# Patient Record
Sex: MALE | URBAN - METROPOLITAN AREA
[De-identification: names, ages, dates, MRNs, and addresses within clinical notes are randomized per-mention and may not be internally consistent; named-entity substitution may affect disease eponyms.]

---

## 2021-11-01 ENCOUNTER — OUTPATIENT (OUTPATIENT)
Dept: OUTPATIENT SERVICES | Facility: HOSPITAL | Age: 66
LOS: 1 days | End: 2021-11-01
Payer: MEDICAID

## 2021-11-01 PROCEDURE — G9005: CPT

## 2021-11-02 ENCOUNTER — INPATIENT (INPATIENT)
Facility: HOSPITAL | Age: 66
LOS: 0 days | Discharge: HOME | DRG: 247 | End: 2021-11-03
Attending: INTERNAL MEDICINE | Admitting: INTERNAL MEDICINE
Payer: COMMERCIAL

## 2021-11-02 VITALS
HEIGHT: 61 IN | TEMPERATURE: 98 F | HEART RATE: 85 BPM | SYSTOLIC BLOOD PRESSURE: 156 MMHG | OXYGEN SATURATION: 99 % | RESPIRATION RATE: 18 BRPM | DIASTOLIC BLOOD PRESSURE: 81 MMHG | WEIGHT: 121.92 LBS

## 2021-11-02 DIAGNOSIS — I10 ESSENTIAL (PRIMARY) HYPERTENSION: ICD-10-CM

## 2021-11-02 DIAGNOSIS — N18.9 CHRONIC KIDNEY DISEASE, UNSPECIFIED: ICD-10-CM

## 2021-11-02 DIAGNOSIS — I25.10 ATHEROSCLEROTIC HEART DISEASE OF NATIVE CORONARY ARTERY WITHOUT ANGINA PECTORIS: ICD-10-CM

## 2021-11-02 DIAGNOSIS — E78.5 HYPERLIPIDEMIA, UNSPECIFIED: ICD-10-CM

## 2021-11-02 LAB
A1C WITH ESTIMATED AVERAGE GLUCOSE RESULT: 5.9 % — HIGH (ref 4–5.6)
ALBUMIN SERPL ELPH-MCNC: 4.3 G/DL — SIGNIFICANT CHANGE UP (ref 3.3–5)
ALP SERPL-CCNC: 89 U/L — SIGNIFICANT CHANGE UP (ref 40–120)
ALT FLD-CCNC: 25 U/L — SIGNIFICANT CHANGE UP (ref 10–45)
ANION GAP SERPL CALC-SCNC: 11 MMOL/L — SIGNIFICANT CHANGE UP (ref 5–17)
APTT BLD: 32.1 SEC — SIGNIFICANT CHANGE UP (ref 27.5–35.5)
AST SERPL-CCNC: 28 U/L — SIGNIFICANT CHANGE UP (ref 10–40)
BASOPHILS # BLD AUTO: 0.03 K/UL — SIGNIFICANT CHANGE UP (ref 0–0.2)
BASOPHILS NFR BLD AUTO: 0.5 % — SIGNIFICANT CHANGE UP (ref 0–2)
BILIRUB SERPL-MCNC: 1.1 MG/DL — SIGNIFICANT CHANGE UP (ref 0.2–1.2)
BUN SERPL-MCNC: 29 MG/DL — HIGH (ref 7–23)
CALCIUM SERPL-MCNC: 10 MG/DL — SIGNIFICANT CHANGE UP (ref 8.4–10.5)
CHLORIDE SERPL-SCNC: 108 MMOL/L — SIGNIFICANT CHANGE UP (ref 96–108)
CK MB CFR SERPL CALC: 2.2 NG/ML — SIGNIFICANT CHANGE UP (ref 0–6.7)
CK SERPL-CCNC: 101 U/L — SIGNIFICANT CHANGE UP (ref 30–200)
CO2 SERPL-SCNC: 21 MMOL/L — LOW (ref 22–31)
CREAT SERPL-MCNC: 1.58 MG/DL — HIGH (ref 0.5–1.3)
EOSINOPHIL # BLD AUTO: 0.11 K/UL — SIGNIFICANT CHANGE UP (ref 0–0.5)
EOSINOPHIL NFR BLD AUTO: 1.7 % — SIGNIFICANT CHANGE UP (ref 0–6)
ESTIMATED AVERAGE GLUCOSE: 123 MG/DL — HIGH (ref 68–114)
GAS PNL BLDV: SIGNIFICANT CHANGE UP
GLUCOSE SERPL-MCNC: 139 MG/DL — HIGH (ref 70–99)
HCT VFR BLD CALC: 38.1 % — LOW (ref 39–50)
HGB BLD-MCNC: 12.5 G/DL — LOW (ref 13–17)
IMM GRANULOCYTES NFR BLD AUTO: 0.3 % — SIGNIFICANT CHANGE UP (ref 0–1.5)
INR BLD: 1.2 — HIGH (ref 0.88–1.16)
LYMPHOCYTES # BLD AUTO: 1.83 K/UL — SIGNIFICANT CHANGE UP (ref 1–3.3)
LYMPHOCYTES # BLD AUTO: 27.8 % — SIGNIFICANT CHANGE UP (ref 13–44)
MCHC RBC-ENTMCNC: 31.5 PG — SIGNIFICANT CHANGE UP (ref 27–34)
MCHC RBC-ENTMCNC: 32.8 GM/DL — SIGNIFICANT CHANGE UP (ref 32–36)
MCV RBC AUTO: 96 FL — SIGNIFICANT CHANGE UP (ref 80–100)
MONOCYTES # BLD AUTO: 0.59 K/UL — SIGNIFICANT CHANGE UP (ref 0–0.9)
MONOCYTES NFR BLD AUTO: 9 % — SIGNIFICANT CHANGE UP (ref 2–14)
NEUTROPHILS # BLD AUTO: 4.01 K/UL — SIGNIFICANT CHANGE UP (ref 1.8–7.4)
NEUTROPHILS NFR BLD AUTO: 60.7 % — SIGNIFICANT CHANGE UP (ref 43–77)
NRBC # BLD: 0 /100 WBCS — SIGNIFICANT CHANGE UP (ref 0–0)
NT-PROBNP SERPL-SCNC: 59 PG/ML — SIGNIFICANT CHANGE UP (ref 0–300)
PLATELET # BLD AUTO: 139 K/UL — LOW (ref 150–400)
POTASSIUM SERPL-MCNC: 4.4 MMOL/L — SIGNIFICANT CHANGE UP (ref 3.5–5.3)
POTASSIUM SERPL-SCNC: 4.4 MMOL/L — SIGNIFICANT CHANGE UP (ref 3.5–5.3)
PROT SERPL-MCNC: 7.3 G/DL — SIGNIFICANT CHANGE UP (ref 6–8.3)
PROTHROM AB SERPL-ACNC: 14.3 SEC — HIGH (ref 10.6–13.6)
RBC # BLD: 3.97 M/UL — LOW (ref 4.2–5.8)
RBC # FLD: 14.1 % — SIGNIFICANT CHANGE UP (ref 10.3–14.5)
SARS-COV-2 RNA SPEC QL NAA+PROBE: NEGATIVE — SIGNIFICANT CHANGE UP
SODIUM SERPL-SCNC: 140 MMOL/L — SIGNIFICANT CHANGE UP (ref 135–145)
TROPONIN T SERPL-MCNC: <0.01 NG/ML — SIGNIFICANT CHANGE UP (ref 0–0.01)
WBC # BLD: 6.59 K/UL — SIGNIFICANT CHANGE UP (ref 3.8–10.5)
WBC # FLD AUTO: 6.59 K/UL — SIGNIFICANT CHANGE UP (ref 3.8–10.5)

## 2021-11-02 PROCEDURE — 93454 CORONARY ARTERY ANGIO S&I: CPT | Mod: 26,59

## 2021-11-02 PROCEDURE — 93010 ELECTROCARDIOGRAM REPORT: CPT

## 2021-11-02 PROCEDURE — 99285 EMERGENCY DEPT VISIT HI MDM: CPT

## 2021-11-02 PROCEDURE — 71045 X-RAY EXAM CHEST 1 VIEW: CPT | Mod: 26

## 2021-11-02 PROCEDURE — 99222 1ST HOSP IP/OBS MODERATE 55: CPT

## 2021-11-02 PROCEDURE — 99152 MOD SED SAME PHYS/QHP 5/>YRS: CPT

## 2021-11-02 PROCEDURE — 92928 PRQ TCAT PLMT NTRAC ST 1 LES: CPT | Mod: RC

## 2021-11-02 RX ORDER — SODIUM CHLORIDE 9 MG/ML
500 INJECTION INTRAMUSCULAR; INTRAVENOUS; SUBCUTANEOUS
Refills: 0 | Status: DISCONTINUED | OUTPATIENT
Start: 2021-11-02 | End: 2021-11-03

## 2021-11-02 RX ORDER — ONDANSETRON 8 MG/1
4 TABLET, FILM COATED ORAL ONCE
Refills: 0 | Status: COMPLETED | OUTPATIENT
Start: 2021-11-02 | End: 2021-11-02

## 2021-11-02 RX ORDER — LOSARTAN POTASSIUM 100 MG/1
100 TABLET, FILM COATED ORAL DAILY
Refills: 0 | Status: DISCONTINUED | OUTPATIENT
Start: 2021-11-03 | End: 2021-11-02

## 2021-11-02 RX ORDER — LOSARTAN POTASSIUM 100 MG/1
100 TABLET, FILM COATED ORAL DAILY
Refills: 0 | Status: DISCONTINUED | OUTPATIENT
Start: 2021-11-03 | End: 2021-11-03

## 2021-11-02 RX ORDER — SODIUM CHLORIDE 9 MG/ML
500 INJECTION INTRAMUSCULAR; INTRAVENOUS; SUBCUTANEOUS ONCE
Refills: 0 | Status: COMPLETED | OUTPATIENT
Start: 2021-11-02 | End: 2021-11-02

## 2021-11-02 RX ORDER — ASPIRIN/CALCIUM CARB/MAGNESIUM 324 MG
325 TABLET ORAL ONCE
Refills: 0 | Status: COMPLETED | OUTPATIENT
Start: 2021-11-02 | End: 2021-11-02

## 2021-11-02 RX ORDER — ASPIRIN/CALCIUM CARB/MAGNESIUM 324 MG
81 TABLET ORAL DAILY
Refills: 0 | Status: DISCONTINUED | OUTPATIENT
Start: 2021-11-02 | End: 2021-11-02

## 2021-11-02 RX ORDER — CLOPIDOGREL BISULFATE 75 MG/1
75 TABLET, FILM COATED ORAL DAILY
Refills: 0 | Status: DISCONTINUED | OUTPATIENT
Start: 2021-11-02 | End: 2021-11-03

## 2021-11-02 RX ORDER — ASPIRIN/CALCIUM CARB/MAGNESIUM 324 MG
81 TABLET ORAL DAILY
Refills: 0 | Status: DISCONTINUED | OUTPATIENT
Start: 2021-11-03 | End: 2021-11-03

## 2021-11-02 RX ORDER — ATORVASTATIN CALCIUM 80 MG/1
40 TABLET, FILM COATED ORAL AT BEDTIME
Refills: 0 | Status: DISCONTINUED | OUTPATIENT
Start: 2021-11-02 | End: 2021-11-03

## 2021-11-02 RX ORDER — METOPROLOL TARTRATE 50 MG
1 TABLET ORAL
Qty: 0 | Refills: 0 | DISCHARGE

## 2021-11-02 RX ORDER — METOPROLOL TARTRATE 50 MG
50 TABLET ORAL DAILY
Refills: 0 | Status: DISCONTINUED | OUTPATIENT
Start: 2021-11-02 | End: 2021-11-03

## 2021-11-02 RX ORDER — LOSARTAN POTASSIUM 100 MG/1
1 TABLET, FILM COATED ORAL
Qty: 0 | Refills: 0 | DISCHARGE

## 2021-11-02 RX ORDER — ATORVASTATIN CALCIUM 80 MG/1
1 TABLET, FILM COATED ORAL
Qty: 0 | Refills: 0 | DISCHARGE

## 2021-11-02 RX ADMIN — CLOPIDOGREL BISULFATE 75 MILLIGRAM(S): 75 TABLET, FILM COATED ORAL at 14:47

## 2021-11-02 RX ADMIN — SODIUM CHLORIDE 75 MILLILITER(S): 9 INJECTION INTRAMUSCULAR; INTRAVENOUS; SUBCUTANEOUS at 19:47

## 2021-11-02 RX ADMIN — SODIUM CHLORIDE 500 MILLILITER(S): 9 INJECTION INTRAMUSCULAR; INTRAVENOUS; SUBCUTANEOUS at 14:47

## 2021-11-02 RX ADMIN — Medication 325 MILLIGRAM(S): at 14:47

## 2021-11-02 RX ADMIN — Medication 50 MILLIGRAM(S): at 21:37

## 2021-11-02 RX ADMIN — ATORVASTATIN CALCIUM 40 MILLIGRAM(S): 80 TABLET, FILM COATED ORAL at 21:37

## 2021-11-02 NOTE — ED ADULT TRIAGE NOTE - CHIEF COMPLAINT QUOTE
Patient arrives ambulatory reporting shortness of breath on exertion, getting progressively worse.  Patient states ongoing "for a while," and spoke with his doctor yesterday who recommended he come to the ED for evaluation.  Reports Hx cardiac stents 2017.

## 2021-11-02 NOTE — ED PROVIDER NOTE - PROGRESS NOTE DETAILS
Called pt cardiologist MD Gibran MD planning on cath today    MD Bejarano (Cardiac Tele) and Cath Lab PA called, will come assess patient

## 2021-11-02 NOTE — ED PROVIDER NOTE - ATTENDING CONTRIBUTION TO CARE
Attending Statement: I have personally performed a face to face diagnostic evaluation on this patient. I have reviewed the ACP note and agree with the history, exam and plan of care, except as noted.     Attending Contribution to Care:  66M former smoker with a PMH of CKD (baseline CR unknown), HTN, known CAD s/p PCI RCA and LCx with residual mLAD 70-80% stenosis @ [facility] 2017 was sent to [facility] ED (11/2/2021) with unstable angina, EKG no stemi, labs with o emergent findings, d/w cards and will admit for possible cath today. Pt HD stable with no CP at rest at this time.
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
## 2021-11-02 NOTE — DISCHARGE NOTE PROVIDER - CARE PROVIDER_API CALL
Thomas Leary)  Cardiovascular Disease; Interventional Cardiology  [address], [address]  [place] [address]  Phone: [phone]  Fax: [phone]  Established Patient  Follow Up Time: 1 week

## 2021-11-02 NOTE — DISCHARGE NOTE PROVIDER - HOSPITAL COURSE
65 yo male,  former smoker with a PMH of CKD (baseline CR unknown), HTN, known CAD s/p PCI RCA and LCx with residual mLAD 70-80% stenosis @ [facility] 2017 was referred to [facility] ED (11/2/2021) due to worsening substernal chest tightness associated with STRATTON when climbing stairs and walking uphill, relieved with rest x several weeks. This morning, chest discomfort was more intense prompting him to call his cardiologist Dr. Leary who instructed him to the ED. Patient had  abnormal Exercise Nuclear Stress Test (10/2021) and per patient he was only able to complete five minutes of exercise before experiencing fatigue and chest discomfort.  Patient denies leg edema, PND, orthopnea, abdominal  pain, leg edema, N/V, dizziness, palpitations, or syncope..  On arrival: BP: 156/81, HR: 80s, RR: 18, Afebrile, O2: 99% RA. 12 Lead EKG revealed Sinus Rhythm @ 77 BPM with no acute changes. Wet read of Frontal CXR revealed no congestion or infiltrate. Pertinent lab values include BNP: WNL,  H/H: 12.5/38.1, platelets 139, BUN/CR: 23/1.58, Troponin negative x1. COVID-19 PCR negative. Patient remains compliant with ASA 81 mg daily and Plavix 75 mg daily (but did not take today). Patient ordered for  mg x one dose, Plavix 75 mg x one dose and  cc bolus administered per discussion with Interventional Cardiologist Dr. Leary. Patient admitted to cardiology for cardiac catheterization with possible intervention if clinically indicated.  Patient is now s/p cardiac catheterization (11/2/2021) and received successful ___________________. Patient has been seen and examined at bedside this morning. Patient is out of bed ambulating with no complaints. Right radial/groin access stable with no hematoma, no bleed, 2+ radial pulse. Lab values, telemetry and vital signs have been reviewed and remain stable. Discharge medication regimen has been reviewed with patient and Dr. RODRIGUEZ; patient will continue to take Aspirin 81 mg daily, Plavix 75 mg daily, Lipitor 40 mg daily, Losartan 100 mg daily, Toprol XL 50 mg daily. Patient has been cleared for discharge at this time per Dr. Manzo and patient will follow up with Dr. Leary in 1-2 weeks for a post PCI check up. All discharge instructions have been reviewed with patient and medications have been e-prescribed to patient’s preferred pharmacy. Referral and prescription given for cardiac rehab. Patient given a list of locations and instructed to contact their insurance company to review participating providers in their network. Patient instructed to bring prescription with them to their follow up appointment with their cardiologist who can further assist in cardiac rehab enrollment.  Education on benefits of cardiac rehab provided to patient.    67 yo male,  former smoker with a PMH of CKD (baseline CR unknown), HTN, known CAD s/p PCI RCA and LCx with residual mLAD 70-80% stenosis @ [facility] 2017 was referred to [facility] ED (11/2/2021) due to worsening substernal chest tightness associated with STRATTON when climbing stairs and walking uphill, relieved with rest x several weeks. This morning, chest discomfort was more intense prompting him to call his cardiologist Dr. Leary who instructed him to the ED. Patient had  abnormal Exercise Nuclear Stress Test (10/2021) and per patient he was only able to complete five minutes of exercise before experiencing fatigue and chest discomfort.  Patient denies leg edema, PND, orthopnea, abdominal  pain, leg edema, N/V, dizziness, palpitations, or syncope..  On arrival: BP: 156/81, HR: 80s, RR: 18, Afebrile, O2: 99% RA. 12 Lead EKG revealed Sinus Rhythm @ 77 BPM with no acute changes. Wet read of Frontal CXR revealed no congestion or infiltrate. Pertinent lab values include BNP: WNL,  H/H: 12.5/38.1, platelets 139, BUN/CR: 23/1.58, Troponin negative x1. COVID-19 PCR negative. Patient remains compliant with ASA 81 mg daily and Plavix 75 mg daily (but did not take today). Patient ordered for  mg x one dose, Plavix 75 mg x one dose and  cc bolus administered per discussion with Interventional Cardiologist Dr. Leary. Patient admitted to cardiology for cardiac catheterization with possible intervention if clinically indicated.  Patient is now s/p cardiac catheterization (11/2/2021) and received successful ___________________. Patient has been seen and examined at bedside this morning. Patient is out of bed ambulating with no complaints. Right radial/groin access stable with no hematoma, no bleed, 2+ radial pulse. Lab values (CR this AM_______), telemetry and vital signs have been reviewed and remain stable. Discharge medication regimen has been reviewed with patient and Dr. Hernandez; patient will continue to take Aspirin 81 mg daily, Plavix 75 mg daily, Lipitor 40 mg daily, Losartan 100 mg daily, Toprol XL 50 mg daily. Patient has been cleared for discharge at this time per Dr. Manzo and patient will follow up with Dr. Leary in 1-2 weeks for a post PCI check up. All discharge instructions have been reviewed with patient and medications have been e-prescribed to patient’s preferred pharmacy. Referral and prescription given for cardiac rehab. Patient given a list of locations and instructed to contact their insurance company to review participating providers in their network. Patient instructed to bring prescription with them to their follow up appointment with their cardiologist who can further assist in cardiac rehab enrollment.  Education on benefits of cardiac rehab provided to patient.    67 yo male,  former smoker with a PMH of CKD (baseline CR unknown), HTN, known CAD s/p PCI RCA and LCx with residual mLAD 70-80% stenosis @ [facility] 2017 was referred to [facility] ED (11/2/2021) due to worsening substernal chest tightness associated with STRATTON when climbing stairs and walking uphill, relieved with rest x several weeks. This morning, chest discomfort was more intense prompting him to call his cardiologist Dr. Leary who instructed him to the ED. Patient had  abnormal Exercise Nuclear Stress Test (10/2021) and per patient he was only able to complete five minutes of exercise before experiencing fatigue and chest discomfort.  Patient denies leg edema, PND, orthopnea, abdominal  pain, leg edema, N/V, dizziness, palpitations, or syncope..  On arrival: BP: 156/81, HR: 80s, RR: 18, Afebrile, O2: 99% RA. 12 Lead EKG revealed Sinus Rhythm @ 77 BPM with no acute changes. Wet read of Frontal CXR revealed no congestion or infiltrate. Pertinent lab values include BNP: WNL,  H/H: 12.5/38.1, platelets 139, BUN/CR: 23/1.58, Troponin negative x1. COVID-19 PCR negative. Patient remains compliant with ASA 81 mg daily and Plavix 75 mg daily (but did not take today). Patient ordered for  mg x one dose, Plavix 75 mg x one dose and  cc bolus administered per discussion with Interventional Cardiologist Dr. Leary. Patient admitted to cardiology for cardiac catheterization with possible intervention if clinically indicated.  Patient is now s/p cardiac catheterization (11/2/2021) and received successful revealing BEATRIZ x 2 proxRCA (80%ruptured plaque), BEATRIZ dRCA (80% ruptured plaque), patent stents in LAD and pLCX,  Patient has been seen and examined at bedside this morning. Patient is out of bed ambulating with no complaints. Right groin access stable with no hematoma, no bleed, 2+ radial pulse. Lab values (CR this AM_______), telemetry and vital signs have been reviewed and remain stable. Discharge medication regimen has been reviewed with patient and Dr. Hernandez; patient will continue to take Aspirin 81 mg daily, Plavix 75 mg daily, Lipitor 40 mg daily, Losartan 100 mg daily, Toprol XL 50 mg daily. Patient has been cleared for discharge at this time per Dr. Manzo and patient will follow up with Dr. Leary in 1-2 weeks for a post PCI check up. All discharge instructions have been reviewed with patient and medications have been e-prescribed to patient’s preferred pharmacy. Referral and prescription given for cardiac rehab. Patient given a list of locations and instructed to contact their insurance company to review participating providers in their network. Patient instructed to bring prescription with them to their follow up appointment with their cardiologist who can further assist in cardiac rehab enrollment.  Education on benefits of cardiac rehab provided to patient.    65 yo male,  former smoker with a PMH of CKD (baseline CR unknown), HTN, known CAD s/p PCI RCA and LCx with residual mLAD 70-80% stenosis @ [facility] 2017 was referred to [facility] ED (11/2/2021) due to worsening substernal chest tightness associated with STRATTON when climbing stairs and walking uphill, relieved with rest x several weeks. Pt reports chest discomfort was more intense prompting him to call his cardiologist Dr. Laery who instructed him to the ED. Patient had  abnormal Exercise Nuclear Stress Test (10/2021) and per patient he was only able to complete five minutes of exercise before experiencing fatigue and chest discomfort.  EKG revealed Sinus Rhythm @ 77 BPM with no acute changes. CXR revealed no congestion or infiltrate. Pertinent lab values include BUN/CR: 23/1.58, Troponin negative x1. COVID-19 PCR negative. Patient remains compliant with ASA 81 mg daily and Plavix 75 mg daily. Patient admitted to cardiology for cardiac catheterization. Patient is now s/p cardiac catheterization (11/2/2021) w/ Dr Leary and received successful revealing BEATRIZ x 2 proxRCA (80%ruptured plaque), BEATRIZ dRCA (80% ruptured plaque), patent stents in LAD and pLCX. ECHO revealed EF 55-60%, unremarkable, no significant valvular disease.   Patient has been seen and examined at bedside this morning. Patient is out of bed ambulating with no complaints. Right groin access stable with no hematoma, no bleed, 2+ radial pulse. Lab values (crea improving this AM 1.48), telemetry and vital signs have been reviewed and remain stable. Discharge medication regimen has been reviewed with patient and Dr. Hernandez; patient will continue to take Aspirin 81 mg daily, Plavix 75 mg daily, Lipitor 40 mg daily, Losartan 100 mg daily, Toprol XL 50 mg daily. Patient has been cleared for discharge at this time per Dr. Hernandez and patient will follow up with Dr. Leary in 1-2 weeks for a post PCI check up. All discharge instructions have been reviewed with patient and medications have been e-prescribed to patient’s preferred pharmacy. Referral and prescription given for cardiac rehab. Patient given a list of locations and instructed to contact their insurance company to review participating providers in their network. Patient instructed to bring prescription with them to their follow up appointment with their cardiologist who can further assist in cardiac rehab enrollment.  Education on benefits of cardiac rehab provided to patient.

## 2021-11-02 NOTE — DISCHARGE NOTE PROVIDER - NSDCMRMEDTOKEN_GEN_ALL_CORE_FT
aspirin 81 mg oral tablet: 1 tab(s) orally once a day  atorvastatin 40 mg oral tablet: 1 tab(s) orally once a day  clopidogrel 75 mg oral tablet: 1 tab(s) orally once a day  losartan 100 mg oral tablet: 1 tab(s) orally once a day  metoprolol succinate 50 mg oral tablet, extended release: 1 tab(s) orally once a day   aspirin 81 mg oral delayed release tablet: 1 tab(s) orally once a day   atorvastatin 40 mg oral tablet: 1 tab(s) orally once a day  Cardiac rehabilitation. 3 times a week for 12 weeks. Dx CAD s/p PCI. Outpatient cardiologist Dr Issac Leary [phone]:   clopidogrel 75 mg oral tablet: 1 tab(s) orally once a day  famotidine 20 mg oral tablet: 1 tab(s) orally 2 times a day  losartan 100 mg oral tablet: 1 tab(s) orally once a day  metoprolol succinate 50 mg oral tablet, extended release: 1 tab(s) orally once a day

## 2021-11-02 NOTE — H&P ADULT - ASSESSMENT
65 yo male,  former smoker with a PMH of CKD (baseline CR unknown), HTN, known CAD s/p PCI RCA and LCx with residual mLAD 70-80% stenosis @ [facility] 2017 was referred to [facility] ED (11/2/2021) with unstable angina with admission to cardiologist with plan for cardiac catheterization with possible intervention if clinically indicated today.     Risks & benefits of procedure and alternative therapy have been explained to the patient including but not limited to: allergic reaction, bleeding w/possible need for blood transfusion, infection, renal and vascular compromise, limb damage, arrhythmia, stroke, vessel dissection/perforation, Myocardial infarction, emergent CABG. Informed consent obtained and in chart.

## 2021-11-02 NOTE — H&P ADULT - BREASTS
DISPLAY PLAN FREE TEXT DISPLAY PLAN FREE TEXT DISPLAY PLAN FREE TEXT DISPLAY PLAN FREE TEXT DISPLAY PLAN FREE TEXT DISPLAY PLAN FREE TEXT DISPLAY PLAN FREE TEXT DISPLAY PLAN FREE TEXT DISPLAY PLAN FREE TEXT DISPLAY PLAN FREE TEXT DISPLAY PLAN FREE TEXT DISPLAY PLAN FREE TEXT not examined

## 2021-11-02 NOTE — DISCHARGE NOTE PROVIDER - NSDCCPTREATMENT_GEN_ALL_CORE_FT
PRINCIPAL PROCEDURE  Procedure: 2D echocardiography  Findings and Treatment: CONCLUSIONS:   1. Normal left ventricular size and systolic function.   2. Normal right ventricular size and systolic function.   3. Normal atria.   4. No significant valvular disease.   5. No pericardial effusion.   6. No prior echo is available for comparison.

## 2021-11-02 NOTE — H&P ADULT - NSICDXFAMILYHX_GEN_ALL_CORE_FT
FAMILY HISTORY:  Father  Still living? Unknown  FH: diabetes mellitus, Age at diagnosis: Age Unknown    Mother  Still living? Unknown  Family history of hypertrophic cardiomyopathy, Age at diagnosis: Age Unknown

## 2021-11-02 NOTE — ED ADULT NURSE NOTE - NSIMPLEMENTINTERV_GEN_ALL_ED
Implemented All Universal Safety Interventions:  [place] to call system. Call bell, personal items and telephone within reach. Instruct patient to call for assistance. Room bathroom lighting operational. Non-slip footwear when patient is off stretcher. Physically safe environment: no spills, clutter or unnecessary equipment. Stretcher in lowest position, wheels locked, appropriate side rails in place.

## 2021-11-02 NOTE — DISCHARGE NOTE PROVIDER - NSDCCPCAREPLAN_GEN_ALL_CORE_FT
PRINCIPAL DISCHARGE DIAGNOSIS  Diagnosis: CAD (coronary artery disease)  Assessment and Plan of Treatment:       SECONDARY DISCHARGE DIAGNOSES  Diagnosis: Hypertension  Assessment and Plan of Treatment:     Diagnosis: Hyperlipidemia  Assessment and Plan of Treatment:      PRINCIPAL DISCHARGE DIAGNOSIS  Diagnosis: CAD (coronary artery disease)  Assessment and Plan of Treatment: - You underwent a cardiac catheterization (11/2/2021) and the blockage in your ___________________ was opened with placement of a Drug-Eluting Stent. Please take Aspirin 81 mg daily and Plavix 75 mg daily to keep the stent open in your heart.   - NEVER MISS A DOSE OF ASPIRIN OR PLAVIX; IF YOU DO, YOU ARE AT RISK OF YOUR STENT CLOSING AND HAVING A HEART ATTACK. DO NOT STOP THESE TWO MEDICATIONS UNLESS INSTRUCTED TO DO SO BY YOUR CARDIOLOGIST   - Your procedure was done through your wrist  - You do not need to keep this area covered and you may shower.   - Please avoid any heavy lifting (no more than 3 to 5 lbs) or strenuous activity for five days. If you develop any swelling, bleeding, hardening of the skin (hematoma formation), acute pain, numbness/tingling  in your arm please contact your doctor immediately or call our 24/7 line: [phone]   - Please follow up with your cardiologist Dr. Leary within 1-2 weeks of discharge for a check up on your heart   - We have provided you with a prescription for cardiac rehab which is a medically supervised exercise program for your heart and has been shown to improve the quantity and quality of life of people with heart disease like yours.   - You should attend cardiac rehab 3 times per week for 12 weeks.    - We have provided you with a list of nearby facilities.   - Please call your insurance carrier to determine which of these facilities are covered under your plan.   - Please bring this prescription with you to your follow up appointment with your cardiologist who can then further assist you to enroll into a cardiac rehab program.        SECONDARY DISCHARGE DIAGNOSES  Diagnosis: Hypertension  Assessment and Plan of Treatment: Please continue your Toprol XL 50 mg daily and Losartan 100 mg daily to prevent further plaque build up in your arteries.    Diagnosis: Hyperlipidemia  Assessment and Plan of Treatment: Please continue your Atorvastatin 40 mg daily to prevent further plaque build up in your arteries   -Your cholesterol panel is abnormal. Your LDL is (INSERT) mg/dL and your goal LDL is less than 70 mg/dL. LDL is also known as "bad cholesterol" because it takes cholesterol to your arteries, where it may collect in artery walls. Too much cholesterol in your arteries may lead to a buildup of plaque known as atherosclerosis and can cause heart disease.   -Your HDL is (INSERT) mg/dL and your goal HDL is greater than 50 mg/dL. The higher the HDL the better; HDL is known as “good cholesterol” because it transports cholesterol to your liver to be expelled from your body.     PRINCIPAL DISCHARGE DIAGNOSIS  Diagnosis: CAD (coronary artery disease)  Assessment and Plan of Treatment: - You underwent a cardiac catheterization (11/2/2021) and the blockage in your ___________________ was opened with placement of a Drug-Eluting Stent. Please take Aspirin 81 mg daily and Plavix 75 mg daily to keep the stent open in your heart.   - NEVER MISS A DOSE OF ASPIRIN OR PLAVIX; IF YOU DO, YOU ARE AT RISK OF YOUR STENT CLOSING AND HAVING A HEART ATTACK. DO NOT STOP THESE TWO MEDICATIONS UNLESS INSTRUCTED TO DO SO BY YOUR CARDIOLOGIST   - Your procedure was done through your wrist  - You do not need to keep this area covered and you may shower.   - Please avoid any heavy lifting (no more than 3 to 5 lbs) or strenuous activity for five days. If you develop any swelling, bleeding, hardening of the skin (hematoma formation), acute pain, numbness/tingling  in your arm please contact your doctor immediately or call our 24/7 line: [phone]   - Please follow up with your cardiologist Dr. Leary within 1-2 weeks of discharge for a check up on your heart   - We have provided you with a prescription for cardiac rehab which is a medically supervised exercise program for your heart and has been shown to improve the quantity and quality of life of people with heart disease like yours.   - You should attend cardiac rehab 3 times per week for 12 weeks.    - We have provided you with a list of nearby facilities.   - Please call your insurance carrier to determine which of these facilities are covered under your plan.   - Please bring this prescription with you to your follow up appointment with your cardiologist who can then further assist you to enroll into a cardiac rehab program.        SECONDARY DISCHARGE DIAGNOSES  Diagnosis: Hypertension  Assessment and Plan of Treatment: Please continue your Toprol XL 50 mg daily and Losartan 100 mg daily to prevent further plaque build up in your arteries.    Diagnosis: Hyperlipidemia  Assessment and Plan of Treatment: Please continue your Atorvastatin 40 mg daily to prevent further plaque build up in your arteries   -Your cholesterol panel is abnormal. Your LDL is (INSERT) mg/dL and your goal LDL is less than 70 mg/dL. LDL is also known as "bad cholesterol" because it takes cholesterol to your arteries, where it may collect in artery walls. Too much cholesterol in your arteries may lead to a buildup of plaque known as atherosclerosis and can cause heart disease.   -Your HDL is (INSERT) mg/dL and your goal HDL is greater than 50 mg/dL. The higher the HDL the better; HDL is known as “good cholesterol” because it transports cholesterol to your liver to be expelled from your body.    Diagnosis: Chronic kidney disease, unspecified CKD stage  Assessment and Plan of Treatment: -You have a history of chronic kidney disease. This is a condition in which the kidneys lose some of their ability to remove waste products and excess fluid from the bloodstream. The most common causes of CKD are diabetes and high blood pressure.   -You received contrast during your cardiac catheterization (11/1/2021) which is processed through the kidneys. We gave you IV fluid to help protect your kidneys during the procedure and your kidney function is stable this AM.   -Please follow up with your PCP for repeat blood work within 72 hours of discharge to ensure your renal function is stable.         PRINCIPAL DISCHARGE DIAGNOSIS  Diagnosis: CAD (coronary artery disease)  Assessment and Plan of Treatment: You had an abnormal nuclear stress test and you came into the hospital for chest pain. You underwent a cardiac catheterization where 3 drug-eluting cardiac stents were placed to blockage in the Right Coronary Artery. You will need to continue taking antiplatelet medications Asprin and Plavix daily for the cardiac stent. DO NOT STOP taking these medications unless directed by your cardiologist as this can be LIFE THREATENING and lead to closing up of the cardiac stent and lead to a heart attack!      SECONDARY DISCHARGE DIAGNOSES  Diagnosis: Hypertension  Assessment and Plan of Treatment: Please continue your blood pressure medications Toprol XL 50 mg daily and Losartan 100 mg daily.    Diagnosis: Hyperlipidemia  Assessment and Plan of Treatment: Please continue your Atorvastatin 40 mg daily to prevent further plaque build up in your arteries.    Diagnosis: Chronic kidney disease, unspecified CKD stage  Assessment and Plan of Treatment: -You have a history of chronic kidney disease. This is a condition in which the kidneys lose some of their ability to remove waste products and excess fluid from the bloodstream. The most common causes of CKD are diabetes and high blood pressure.   -You received contrast during your cardiac catheterization (11/1/2021) which is processed through the kidneys. We gave you IV fluid to help protect your kidneys during the procedure and your kidney function is stable this AM. Your creatitine level on day of discharge is 1.48. Please follow up with your PCP for repeat blood work within 72 hours of discharge to ensure your renal function is stable.
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
## 2021-11-02 NOTE — H&P ADULT - PROBLEM SELECTOR PLAN 2
-SBP: 120-150 mmHG  -HOME regimen: Toprol XL 50 mg daily and Losartan 100 mg daily (to resume ARB therapy in AM pending renal function).
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
## 2021-11-02 NOTE — ED PROVIDER NOTE - NSICDXPASTMEDICALHX_GEN_ALL_CORE_FT
CARDIAC CLEARANCE     What type of procedure are you having? Spinal injection    Which physician is performing your procedure? Lauryn Monte    When is your procedure scheduled for? 4-12-21    Where are you having this procedure? In office [facility]     Are you taking Blood Thinners? yes   If so what? (Name/dose/frequesncy)  warfarin 5 mg.  1 a day       Does the surgeon want you to stop your blood thinner? If so for how long? Yes   5 to 7 days prior  If approve patient needs to know when to start blood thinners back up.   Phone Number and Contact Name for Physicians office Lauryn Monte  [phone]    Fax number to send information  [phone]
Clearanced faxed to number provided. Pt made aware.
OK, OK to hold 5 days
Will discuss with MELODIE
PAST MEDICAL HISTORY:  History of class IV angina pectoris     HTN (hypertension)

## 2021-11-02 NOTE — DISCHARGE NOTE PROVIDER - NSDCFUADDINST_GEN_ALL_CORE_FT
- Do NOT drive or operate hazardous machinery for 24 hours. Limit your physical activity for 24-48 hours. Do NOT engage in sports, heavy work or heavy lifting more than 5 lbs for 5 days.   - You MAY shower and wash the area gently with soap and water. BUT no TUB BATHS, HOT TUBS OR SWIMMING FOR 5 DAYS.  Do not keep the area covered. Do not put any lotions, creams, or ointments on the site.  - Your procedure was done through your right groin. If you observe flank bleeding from the puncture site, it is an emergency. Please put direct pressure on the site and go directly to the ER. Bleeding under the skin may also occur and a small "black and blue" may be expected. If the area appears to be expanding or swelling around the puncture site, apply manual compression and go immediately to the nearest ER. If your leg/foot becomes cool or blue and/or you are unable to move it, this must be treated as an emergency, go directly to the nearest ER. Look for signs of infection in the groin: fever, red streaking of the leg, obvious pus formation and pain.  -If you have any issues or concerns regarding your access site, you may call [facility] Interventional Cardiology at [phone].

## 2021-11-02 NOTE — H&P ADULT - ATTENDING COMMENTS
66M former smoker with CKD (baseline CR unknown), HTN, CAD s/p PCI RCA and LCx with residual mLAD 70-80%) who presents with unstable angina. Patient currently asx, HDS, awaiting cath  Plan for:  DAPT with ASA/ Clopidogrel   High intensity statin Atorva 40mg qHS  Metoprolol 50XL daily  Home Losartan 100mg po daily ordered for 11/3 10AM  Order HbA1c, TFTs, and FLP  Prevention and nutrition consults for lifestyle modification counselling  Patient to be referred to outpatient cardiac rehab upon discharge for cardiac conditioning  NB: Patient requesting EDU discharge unit for 11/3  R-Jameel Montoya M.D.  Cardiology Attending  55minutes spent on total encounter; more than 50% of the visit was spent counseling and/or coordinating care by the attending physician, with plan of care discussed with the patient and cardiac team.

## 2021-11-02 NOTE — DISCHARGE NOTE PROVIDER - NSDCFUADDAPPT_GEN_ALL_CORE_FT
Please follow up with Dr. Leary within one week of discharge for a check up on your heart.  -It is recommended for you to go to cardiac rehabilitation, which is outpatient physical therapy tailored to improve the heart. You were provided a prescription and should call your insurance company to find facilities that is covered by your insurance. We have provided you with a prescription for cardiac rehab which is medically supervised exercise program for your heart. It has been shown to improve the quantity and quality of life of people with heart disease like yours. You should attend cardiac rehab 3 times per week for 12 weeks. We have provided you with a list of nearby facilities. Please call your insurance carrier to determine which of these facilities are covered under your plan.   ---Please bring this prescription with you to your follow up appointment with your cardiologist who can then further assist you to enroll into a cardiac rehab program.

## 2021-11-02 NOTE — H&P ADULT - PROBLEM SELECTOR PLAN 4
-Patient reports history of CKD  -EF normal by cath 2017, per Dr. Leary EF normal by NST in October 2021.   -BUN/CR: 25/1.58 today.  cc bolus given as discussed with Dr. Leary (euvolemic on exam, CXR clear). Followed by NS @ 75 cc/hour thereafter pre procedure  -Will call PCP Dr. Sally Lopez to obtain baseline CR range.     DVT PPx: Deferred given anticipated angiogram today    Dispo: NPO for cardiac catheterization this afternoon with Dr. Leary.

## 2021-11-02 NOTE — H&P ADULT - PROBLEM SELECTOR PLAN 1
-Troponin negative x1  -Currently chest pain free (only occurs with exertion)  -Per patient and Dr. Leary, patient had abnormal NST in October 2021.   -Prior cardiac catheterization ([facility] 9/2017): mRCA 30-50%, dRCA 50-60%, RPDA 90-95%, LM non obstructive, pLAD 50-60%, mLAD 70-80% , pLCx non obstructive, dLCx 80-90% stenosis. LVEF 60%. Intervention: PTCA/BEATRIZ dRCA, PTCA/BEATRIZ dLCx.   -HOME medication regimen to be continued: ASA 81 mg daily, Plavix 75 mg daily, Toprol Xl 50 mg daily, Atorvastatin 40 mg daily. (Remains compliant with DAPT therapy)  -Echo ordered  -F/U Hemoglobin A1c and Lipid panel

## 2021-11-02 NOTE — DISCHARGE NOTE PROVIDER - NSDCACTIVITY_GEN_ALL_CORE
Walking - Indoors allowed/No heavy lifting/straining/Walking - Outdoors allowed Showering allowed/Stairs allowed/Walking - Indoors allowed/No heavy lifting/straining/Walking - Outdoors allowed

## 2021-11-03 VITALS — SYSTOLIC BLOOD PRESSURE: 142 MMHG | DIASTOLIC BLOOD PRESSURE: 69 MMHG | RESPIRATION RATE: 19 BRPM | HEART RATE: 64 BPM

## 2021-11-03 LAB
ALBUMIN SERPL ELPH-MCNC: 3.9 G/DL — SIGNIFICANT CHANGE UP (ref 3.3–5)
ALP SERPL-CCNC: 76 U/L — SIGNIFICANT CHANGE UP (ref 40–120)
ALT FLD-CCNC: 21 U/L — SIGNIFICANT CHANGE UP (ref 10–45)
ANION GAP SERPL CALC-SCNC: 10 MMOL/L — SIGNIFICANT CHANGE UP (ref 5–17)
AST SERPL-CCNC: 26 U/L — SIGNIFICANT CHANGE UP (ref 10–40)
BASOPHILS # BLD AUTO: 0.02 K/UL — SIGNIFICANT CHANGE UP (ref 0–0.2)
BASOPHILS NFR BLD AUTO: 0.2 % — SIGNIFICANT CHANGE UP (ref 0–2)
BILIRUB SERPL-MCNC: 1.5 MG/DL — HIGH (ref 0.2–1.2)
BUN SERPL-MCNC: 24 MG/DL — HIGH (ref 7–23)
CALCIUM SERPL-MCNC: 9.2 MG/DL — SIGNIFICANT CHANGE UP (ref 8.4–10.5)
CHLORIDE SERPL-SCNC: 108 MMOL/L — SIGNIFICANT CHANGE UP (ref 96–108)
CHOLEST SERPL-MCNC: 93 MG/DL — SIGNIFICANT CHANGE UP
CO2 SERPL-SCNC: 21 MMOL/L — LOW (ref 22–31)
COVID-19 NUCLEOCAPSID GAM AB INTERP: NEGATIVE — SIGNIFICANT CHANGE UP
COVID-19 NUCLEOCAPSID TOTAL GAM ANTIBODY RESULT: 0.11 INDEX — SIGNIFICANT CHANGE UP
COVID-19 SPIKE DOMAIN AB INTERP: POSITIVE
COVID-19 SPIKE DOMAIN ANTIBODY RESULT: >250 U/ML — HIGH
CREAT SERPL-MCNC: 1.48 MG/DL — HIGH (ref 0.5–1.3)
EOSINOPHIL # BLD AUTO: 0.01 K/UL — SIGNIFICANT CHANGE UP (ref 0–0.5)
EOSINOPHIL NFR BLD AUTO: 0.1 % — SIGNIFICANT CHANGE UP (ref 0–6)
GLUCOSE SERPL-MCNC: 93 MG/DL — SIGNIFICANT CHANGE UP (ref 70–99)
HCT VFR BLD CALC: 36.8 % — LOW (ref 39–50)
HCV AB S/CO SERPL IA: 0.05 S/CO — SIGNIFICANT CHANGE UP
HCV AB SERPL-IMP: SIGNIFICANT CHANGE UP
HDLC SERPL-MCNC: 39 MG/DL — LOW
HGB BLD-MCNC: 11.6 G/DL — LOW (ref 13–17)
IMM GRANULOCYTES NFR BLD AUTO: 0.2 % — SIGNIFICANT CHANGE UP (ref 0–1.5)
LIPID PNL WITH DIRECT LDL SERPL: 44 MG/DL — SIGNIFICANT CHANGE UP
LYMPHOCYTES # BLD AUTO: 1.97 K/UL — SIGNIFICANT CHANGE UP (ref 1–3.3)
LYMPHOCYTES # BLD AUTO: 21.5 % — SIGNIFICANT CHANGE UP (ref 13–44)
MAGNESIUM SERPL-MCNC: 2.1 MG/DL — SIGNIFICANT CHANGE UP (ref 1.6–2.6)
MCHC RBC-ENTMCNC: 29.8 PG — SIGNIFICANT CHANGE UP (ref 27–34)
MCHC RBC-ENTMCNC: 31.5 GM/DL — LOW (ref 32–36)
MCV RBC AUTO: 94.6 FL — SIGNIFICANT CHANGE UP (ref 80–100)
MONOCYTES # BLD AUTO: 0.9 K/UL — SIGNIFICANT CHANGE UP (ref 0–0.9)
MONOCYTES NFR BLD AUTO: 9.8 % — SIGNIFICANT CHANGE UP (ref 2–14)
NEUTROPHILS # BLD AUTO: 6.24 K/UL — SIGNIFICANT CHANGE UP (ref 1.8–7.4)
NEUTROPHILS NFR BLD AUTO: 68.2 % — SIGNIFICANT CHANGE UP (ref 43–77)
NON HDL CHOLESTEROL: 54 MG/DL — SIGNIFICANT CHANGE UP
NRBC # BLD: 0 /100 WBCS — SIGNIFICANT CHANGE UP (ref 0–0)
PLATELET # BLD AUTO: 135 K/UL — LOW (ref 150–400)
POTASSIUM SERPL-MCNC: 4.1 MMOL/L — SIGNIFICANT CHANGE UP (ref 3.5–5.3)
POTASSIUM SERPL-SCNC: 4.1 MMOL/L — SIGNIFICANT CHANGE UP (ref 3.5–5.3)
PROT SERPL-MCNC: 6.7 G/DL — SIGNIFICANT CHANGE UP (ref 6–8.3)
RBC # BLD: 3.89 M/UL — LOW (ref 4.2–5.8)
RBC # FLD: 14.1 % — SIGNIFICANT CHANGE UP (ref 10.3–14.5)
SARS-COV-2 IGG+IGM SERPL QL IA: 0.11 INDEX — SIGNIFICANT CHANGE UP
SARS-COV-2 IGG+IGM SERPL QL IA: >250 U/ML — HIGH
SARS-COV-2 IGG+IGM SERPL QL IA: NEGATIVE — SIGNIFICANT CHANGE UP
SARS-COV-2 IGG+IGM SERPL QL IA: POSITIVE
SODIUM SERPL-SCNC: 139 MMOL/L — SIGNIFICANT CHANGE UP (ref 135–145)
TRIGL SERPL-MCNC: 50 MG/DL — SIGNIFICANT CHANGE UP
WBC # BLD: 9.16 K/UL — SIGNIFICANT CHANGE UP (ref 3.8–10.5)
WBC # FLD AUTO: 9.16 K/UL — SIGNIFICANT CHANGE UP (ref 3.8–10.5)

## 2021-11-03 PROCEDURE — 80053 COMPREHEN METABOLIC PANEL: CPT

## 2021-11-03 PROCEDURE — 83880 ASSAY OF NATRIURETIC PEPTIDE: CPT

## 2021-11-03 PROCEDURE — 86803 HEPATITIS C AB TEST: CPT

## 2021-11-03 PROCEDURE — 83036 HEMOGLOBIN GLYCOSYLATED A1C: CPT

## 2021-11-03 PROCEDURE — 82553 CREATINE MB FRACTION: CPT

## 2021-11-03 PROCEDURE — 93005 ELECTROCARDIOGRAM TRACING: CPT

## 2021-11-03 PROCEDURE — 84132 ASSAY OF SERUM POTASSIUM: CPT

## 2021-11-03 PROCEDURE — 85730 THROMBOPLASTIN TIME PARTIAL: CPT

## 2021-11-03 PROCEDURE — 85610 PROTHROMBIN TIME: CPT

## 2021-11-03 PROCEDURE — 86769 SARS-COV-2 COVID-19 ANTIBODY: CPT

## 2021-11-03 PROCEDURE — 82803 BLOOD GASES ANY COMBINATION: CPT

## 2021-11-03 PROCEDURE — 85025 COMPLETE CBC W/AUTO DIFF WBC: CPT

## 2021-11-03 PROCEDURE — 82330 ASSAY OF CALCIUM: CPT

## 2021-11-03 PROCEDURE — C1887: CPT

## 2021-11-03 PROCEDURE — 99239 HOSP IP/OBS DSCHRG MGMT >30: CPT

## 2021-11-03 PROCEDURE — 84484 ASSAY OF TROPONIN QUANT: CPT

## 2021-11-03 PROCEDURE — 84295 ASSAY OF SERUM SODIUM: CPT

## 2021-11-03 PROCEDURE — C1894: CPT

## 2021-11-03 PROCEDURE — C1769: CPT

## 2021-11-03 PROCEDURE — C1760: CPT

## 2021-11-03 PROCEDURE — 82550 ASSAY OF CK (CPK): CPT

## 2021-11-03 PROCEDURE — 71045 X-RAY EXAM CHEST 1 VIEW: CPT

## 2021-11-03 PROCEDURE — 80061 LIPID PANEL: CPT

## 2021-11-03 PROCEDURE — C1725: CPT

## 2021-11-03 PROCEDURE — C1874: CPT

## 2021-11-03 PROCEDURE — 36415 COLL VENOUS BLD VENIPUNCTURE: CPT

## 2021-11-03 PROCEDURE — 93306 TTE W/DOPPLER COMPLETE: CPT

## 2021-11-03 PROCEDURE — 87635 SARS-COV-2 COVID-19 AMP PRB: CPT

## 2021-11-03 PROCEDURE — 93306 TTE W/DOPPLER COMPLETE: CPT | Mod: 26

## 2021-11-03 PROCEDURE — 99285 EMERGENCY DEPT VISIT HI MDM: CPT | Mod: 25

## 2021-11-03 PROCEDURE — 83735 ASSAY OF MAGNESIUM: CPT

## 2021-11-03 RX ORDER — FAMOTIDINE 10 MG/ML
20 INJECTION INTRAVENOUS DAILY
Refills: 0 | Status: DISCONTINUED | OUTPATIENT
Start: 2021-11-03 | End: 2021-11-03

## 2021-11-03 RX ORDER — ASPIRIN/CALCIUM CARB/MAGNESIUM 324 MG
1 TABLET ORAL
Qty: 0 | Refills: 0 | DISCHARGE

## 2021-11-03 RX ORDER — FAMOTIDINE 10 MG/ML
20 INJECTION INTRAVENOUS
Refills: 0 | Status: DISCONTINUED | OUTPATIENT
Start: 2021-11-03 | End: 2021-11-03

## 2021-11-03 RX ORDER — ASPIRIN/CALCIUM CARB/MAGNESIUM 324 MG
1 TABLET ORAL
Qty: 30 | Refills: 11
Start: 2021-11-03 | End: 2022-10-28

## 2021-11-03 RX ORDER — CLOPIDOGREL BISULFATE 75 MG/1
1 TABLET, FILM COATED ORAL
Qty: 0 | Refills: 0 | DISCHARGE

## 2021-11-03 RX ORDER — CLOPIDOGREL BISULFATE 75 MG/1
1 TABLET, FILM COATED ORAL
Qty: 30 | Refills: 11
Start: 2021-11-03 | End: 2022-10-28

## 2021-11-03 RX ORDER — FAMOTIDINE 10 MG/ML
1 INJECTION INTRAVENOUS
Qty: 0 | Refills: 0 | DISCHARGE

## 2021-11-03 RX ADMIN — Medication 50 MILLIGRAM(S): at 06:09

## 2021-11-03 RX ADMIN — Medication 81 MILLIGRAM(S): at 11:04

## 2021-11-03 RX ADMIN — ONDANSETRON 4 MILLIGRAM(S): 8 TABLET, FILM COATED ORAL at 00:18

## 2021-11-03 RX ADMIN — FAMOTIDINE 20 MILLIGRAM(S): 10 INJECTION INTRAVENOUS at 11:04

## 2021-11-03 RX ADMIN — CLOPIDOGREL BISULFATE 75 MILLIGRAM(S): 75 TABLET, FILM COATED ORAL at 11:04

## 2021-11-03 NOTE — DISCHARGE NOTE NURSING/CASE MANAGEMENT/SOCIAL WORK - PATIENT PORTAL LINK FT
You can access the FollowMyHealth Patient Portal offered by [facility] by registering at the following website: [email or web address]. By joining [facility]’s FollowMyHealth portal, you will also be able to view your health information using other applications (apps) compatible with our system.

## 2021-11-08 DIAGNOSIS — N18.9 CHRONIC KIDNEY DISEASE, UNSPECIFIED: ICD-10-CM

## 2021-11-08 DIAGNOSIS — R07.9 CHEST PAIN, UNSPECIFIED: ICD-10-CM

## 2021-11-08 DIAGNOSIS — Z71.89 OTHER SPECIFIED COUNSELING: ICD-10-CM

## 2021-11-08 DIAGNOSIS — E78.5 HYPERLIPIDEMIA, UNSPECIFIED: ICD-10-CM

## 2021-11-08 DIAGNOSIS — I12.9 HYPERTENSIVE CHRONIC KIDNEY DISEASE WITH STAGE 1 THROUGH STAGE 4 CHRONIC KIDNEY DISEASE, OR UNSPECIFIED CHRONIC KIDNEY DISEASE: ICD-10-CM

## 2021-11-08 DIAGNOSIS — Z87.891 PERSONAL HISTORY OF NICOTINE DEPENDENCE: ICD-10-CM

## 2021-11-08 DIAGNOSIS — I25.118 ATHEROSCLEROTIC HEART DISEASE OF NATIVE CORONARY ARTERY WITH OTHER FORMS OF ANGINA PECTORIS: ICD-10-CM

## 2024-10-08 NOTE — H&P ADULT - NSICDXPASTMEDICALHX_GEN_ALL_CORE_FT
PICC infused with 3ml heparin at this time  
PICC line team at bedside   
Patient stable GCS15  Lying comfortably in bed asleep  No complaints of pain hooked in cardiac monitor   Awaiting for the PICC line team   Vitally stable not in any form of distress   
Patient stable GCS15  Not in any form of distress   Vitally stable no complaints of pain   Iv cannula removed   Discharged summary given and understood   
Report given to Wilber ALVAREZ by Sally ALVAREZ and KIM Portillo. Nurse was informed of reason for arrival, vitals, labs, medications, orders, procedures, results, anything left pending and current plan of action. Questions were asked and received prior to departure from the patient.    
Still unable to get get blood return on picc line. MD made aware. Verbal orders given to place PIV for IV abx. Pt agreeable to this plan at this time.   
Unable to get blood return through PICC line at 0255.   
VM left for PICC team at this time.   
PAST MEDICAL HISTORY:  CAD (coronary artery disease)     HTN (hypertension)     Hyperlipidemia